# Patient Record
Sex: FEMALE | Race: WHITE | NOT HISPANIC OR LATINO | Employment: OTHER | URBAN - METROPOLITAN AREA
[De-identification: names, ages, dates, MRNs, and addresses within clinical notes are randomized per-mention and may not be internally consistent; named-entity substitution may affect disease eponyms.]

---

## 2019-08-20 ENCOUNTER — OFFICE VISIT (OUTPATIENT)
Dept: DERMATOLOGY | Facility: CLINIC | Age: 64
End: 2019-08-20
Payer: MEDICARE

## 2019-08-20 DIAGNOSIS — L82.1 SEBORRHEIC KERATOSIS: ICD-10-CM

## 2019-08-20 DIAGNOSIS — D22.9 NEVUS: Primary | ICD-10-CM

## 2019-08-20 DIAGNOSIS — Z13.89 SCREENING FOR SKIN CONDITION: ICD-10-CM

## 2019-08-20 PROCEDURE — 99203 OFFICE O/P NEW LOW 30 MIN: CPT | Performed by: DERMATOLOGY

## 2019-08-20 RX ORDER — LEVOTHYROXINE AND LIOTHYRONINE 9.5; 2.25 UG/1; UG/1
TABLET ORAL
COMMUNITY
Start: 2019-05-21

## 2019-08-20 RX ORDER — LEVOTHYROXINE AND LIOTHYRONINE 38; 9 UG/1; UG/1
TABLET ORAL
COMMUNITY
Start: 2019-05-21

## 2019-08-20 NOTE — PATIENT INSTRUCTIONS
Nevi reviewed the concept of ABCDE and ugly duckling nothing markedly atypical patient reassured  Seborrheic Keratosis  Patient reasurred these are normal growths we acquire with age no treatment needed    Screening for Dermatologic Disorders: Nothing else of concern noted on complete exam follow up in 1 year

## 2019-08-20 NOTE — PROGRESS NOTES
[facility] DERMATOLOGY  [facility] [address] [address] [phone]     MRN: [id number] : 1955  Encounter: [id number]  Patient Information: Danial Cueva  Chief complaint:  Skin check up    History of present illness:  43-year-old female without previous history of any skin cancer presents for overall skin check concerned regarding potential lesions no specific changes have been noted  Past Medical History:   Diagnosis Date    Hypothyroidism      Past Surgical History:   Procedure Laterality Date    INCONTINENCE SURGERY       Social History   Social History     Substance and Sexual Activity   Alcohol Use Yes    Alcohol/week: 1 0 standard drinks    Types: 1 Glasses of wine per week    Frequency: 2-4 times a month    Drinks per session: 1 or 2    Binge frequency: Never     Social History     Substance and Sexual Activity   Drug Use Never     Social History     Tobacco Use   Smoking Status Never Smoker   Smokeless Tobacco Never Used     Family History   Problem Relation Age of Onset    Esophageal cancer Father     Lung cancer Sister     Brain cancer Sister     Diabetes type I Brother      Meds/Allergies   Allergies   Allergen Reactions    Sulfa Antibiotics Rash     Gets yeast infections        Meds:  Prior to Admission medications    Medication Sig Start Date End Date Taking?  Authorizing Provider   thyroid (ARMOUR THYROID) 60 MG tablet TAKE 1 TABLET DAILY 19  Yes Historical Provider, MD   thyroid (ARMOUR) 15 MG tablet TAKE 1 TABLET EVERY OTHER DAY 19  Yes Historical Provider, MD       Subjective:     Review of Systems:    General: negative for - chills, fatigue, fever,  weight gain or weight loss  Psychological: negative for - anxiety, behavioral disorder, concentration difficulties, decreased libido, depression, irritability, memory difficulties, mood swings, sleep disturbances or suicidal ideation  ENT: negative for - hearing difficulties , nasal congestion, nasal discharge, oral lesions, sinus pain, sneezing, sore throat  Allergy and Immunology: negative for - hives, insect bite sensitivity,  Hematological and Lymphatic: negative for - bleeding problems, blood clots,bruising, swollen lymph nodes  Endocrine: negative for - hair pattern changes, hot flashes, malaise/lethargy, mood swings, palpitations, polydipsia/polyuria, skin changes, temperature intolerance or unexpected weight change  Respiratory: negative for - cough, hemoptysis, orthopnea, shortness of breath, or wheezing  Cardiovascular: negative for - chest pain, dyspnea on exertion, edema,  Gastrointestinal: negative for - abdominal pain, nausea/vomiting  Genito-Urinary: negative for - dysuria, incontinence, irregular/heavy menses or urinary frequency/urgency  Musculoskeletal: negative for - gait disturbance, joint pain, joint stiffness, joint swelling, muscle pain, muscular weakness  Dermatological:  As in HPI  Neurological: negative for confusion, dizziness, headaches, impaired coordination/balance, memory loss, numbness/tingling, seizures, speech problems, tremors or weakness       Objective: There were no vitals taken for this visit  Physical Exam:    General Appearance:    Alert, cooperative, no distress   Head:    Normocephalic, without obvious abnormality, atraumatic           Skin:   A full skin exam was performed including scalp, head scalp, eyes, ears, nose, lips, neck, chest, axilla, abdomen, back, buttocks, bilateral upper extremities, bilateral lower extremities, hands, feet, fingers, toes, fingernails, and toenails normal pigmented lesion regular shape color normal keratotic papules greasy stuck on appearance nothing markedly atypical noted on complete exam     Assessment:     1  Nevus     2  Seborrheic keratosis     3   Screening for skin condition           Plan:   Nevi reviewed the concept of ABCDE and ugly duckling nothing markedly atypical patient reassured  Seborrheic Keratosis  Patient reasurred these are normal growths we acquire with age no treatment needed  Screening for Dermatologic Disorders: Nothing else of concern noted on complete exam follow up in 1 year       Christopher Sanders MD  8/20/2019,10:46 AM    Portions of the record may have been created with voice recognition software   Occasional wrong word or "sound a like" substitutions may have occurred due to the inherent limitations of voice recognition software   Read the chart carefully and recognize, using context, where substitutions have occurred

## 2019-11-25 ENCOUNTER — TELEPHONE (OUTPATIENT)
Dept: GYNECOLOGIC ONCOLOGY | Facility: CLINIC | Age: 64
End: 2019-11-25

## 2019-11-25 NOTE — TELEPHONE ENCOUNTER
Pt was referred by sisters Vivian Logan and Yin Henderson.  Pt lives in the [facility] and would like to see if Dr Leslie can see her because of Abnormal Pathology Report.  Did not want to schedule appt until she spoke to . Pt can fax copy of report.  Please call [phone].

## 2019-12-04 DIAGNOSIS — N87.1 MODERATE CERVICAL DYSPLASIA: Primary | ICD-10-CM

## 2019-12-11 ENCOUNTER — LAB REQUISITION (OUTPATIENT)
Dept: LAB | Facility: HOSPITAL | Age: 64
End: 2019-12-11
Attending: OBSTETRICS & GYNECOLOGY
Payer: MEDICARE

## 2019-12-11 ENCOUNTER — OFFICE VISIT (OUTPATIENT)
Dept: GYNECOLOGIC ONCOLOGY | Facility: CLINIC | Age: 64
End: 2019-12-11
Attending: OBSTETRICS & GYNECOLOGY
Payer: MEDICARE

## 2019-12-11 VITALS
HEART RATE: 79 BPM | DIASTOLIC BLOOD PRESSURE: 73 MMHG | WEIGHT: 161 LBS | SYSTOLIC BLOOD PRESSURE: 143 MMHG | BODY MASS INDEX: 27.49 KG/M2 | HEIGHT: 64 IN

## 2019-12-11 DIAGNOSIS — N85.02 ENDOMETRIAL INTRAEPITHELIAL NEOPLASIA (EIN): ICD-10-CM

## 2019-12-11 DIAGNOSIS — N85.02 EIN (ENDOMETRIAL INTRAEPITHELIAL NEOPLASIA): Primary | ICD-10-CM

## 2019-12-11 DIAGNOSIS — Z71.89 OTHER SPECIFIED COUNSELING: ICD-10-CM

## 2019-12-11 LAB
CASE RPRT: NORMAL
PATH REPORT.FINAL DX SPEC: NORMAL
PATH REPORT.FINAL DX SPEC: NORMAL
PATH REPORT.GROSS SPEC: NORMAL

## 2019-12-11 PROCEDURE — 99205 OFFICE O/P NEW HI 60 MIN: CPT | Performed by: OBSTETRICS & GYNECOLOGY

## 2019-12-11 RX ORDER — MEGESTROL ACETATE 20 MG/1
20 TABLET ORAL 2 TIMES DAILY
Qty: 60 TABLET | Refills: 3 | Status: SHIPPED | OUTPATIENT
Start: 2019-12-11 | End: 2019-12-11 | Stop reason: SDUPTHER

## 2019-12-11 RX ORDER — MEGESTROL ACETATE 20 MG/1
20 TABLET ORAL 2 TIMES DAILY
Qty: 60 TABLET | Refills: 3 | Status: SHIPPED | OUTPATIENT
Start: 2019-12-11 | End: 2020-08-07

## 2019-12-11 RX ORDER — ACETAMINOPHEN 500 MG
2000 TABLET ORAL DAILY
COMMUNITY

## 2019-12-11 RX ORDER — VIT C/E/ZN/COPPR/LUTEIN/ZEAXAN 250MG-90MG
1 CAPSULE ORAL DAILY
COMMUNITY

## 2019-12-11 RX ORDER — GLUCOSAMINE HCL 500 MG
100 TABLET ORAL DAILY
COMMUNITY

## 2019-12-11 RX ORDER — ACETAMINOPHEN 500 MG
TABLET ORAL DAILY
COMMUNITY
End: 2020-09-14

## 2019-12-11 RX ORDER — PHENOL/SODIUM PHENOLATE
20 AEROSOL, SPRAY (ML) MUCOUS MEMBRANE DAILY
COMMUNITY
End: 2020-09-14

## 2019-12-11 RX ORDER — THYROID, PORCINE 15 MG/1
15 TABLET ORAL EVERY OTHER DAY
Refills: 2 | COMMUNITY
Start: 2019-11-25

## 2019-12-11 RX ORDER — NIACIN 250 MG
250 TABLET ORAL
COMMUNITY

## 2019-12-11 NOTE — PROGRESS NOTES
"Nohelia Godinez presents to the office for  second opinion.     Problem List Items Addressed This Visit        Genitourinary    EIN (endometrial intraepithelial neoplasia) - Primary    Overview     Asymptomatic. Pap showed endometrial cells, US pelvis thickened EMS (per patient)  11/7/19 D&C fragments papillary mucinous epithelial proliferation          Relevant Medications    megestrol (MEGACE) 20 mg tablet            Past Medical History:   Diagnosis Date   • Lipid disorder      Past Surgical History:   Procedure Laterality Date   • LAPAROSCOPY DIAGNOSTIC / BIOPSY / ASPIRATION / LYSIS         Current Outpatient Medications:   •  ARMOUR THYROID 15 mg tablet, Take 15 mg by mouth every other day., Disp: , Rfl: 2  •  aspirin-acetaminophen-caffeine (EXCEDRIN MIGRAINE) 250-250-65 mg per tablet, Take 1 tablet by mouth every 6 hours as needed., Disp: , Rfl:   •  cholecalciferol, vitamin D3, 5,000 unit (125 mcg) tablet, Take by mouth daily., Disp: , Rfl:   •  cholecalciferol, vitamin D3, 50 mcg (2,000 unit) capsule, Take 2,000 Units by mouth daily., Disp: , Rfl:   •  coenzyme Q10 (COQ10) 50 mg capsule, Take 100 mg by mouth daily., Disp: , Rfl:   •  loratadine 10 mg capsule, Take by mouth., Disp: , Rfl:   •  niacin 250 mg tablet, Take 250 mg by mouth., Disp: , Rfl:   •  omeprazole 20 mg tablet, Take 20 mg by mouth daily., Disp: , Rfl:   •  red yeast rice 600 mg capsule, , Disp: , Rfl:   •  megestrol (MEGACE) 20 mg tablet, Take  1 tablet (20 mg total) 2 (two) times a day, Disp: 60 tablet, Rfl: 3  Sulfa (sulfonamide antibiotics)  Cancer-related family history is not on file.   reports that she has never smoked. She has never used smokeless tobacco.  ROS: Negative in all systems with the exception of the above    Physical examination: Well-developed female in no apparent distress  Vitals: BP: 143/73  Heart Rate: 79  Height: 161.3 cm (5' 3.5\")  Weight: 73 kg (161 lb) (12/11 1309)  Body mass index is 28.07 kg/m².  HEENT: " Normocephalic, atraumatic, nonicteric sclera  Neck: Supple no masses, thyroid normal nontender  Lymphatic: No inguinal or supraclavicular adenopathy  Heart: Regular rate no murmurs  Lungs: Clear to auscultation with good respiratory effort  Extremities: No clubbing, cyanosis, edema  Neuro: Oriented ×3 with normal mood and affect  Abdomen: Soft, nontender, nondistended. No hepatosplenomegaly. No rebound or guarding.  Gynecologic:  deferred    60 minutes of face-to-face time were spent in direct consultation with the patient, reviewing her records, and reviewing her pathologic slides.    No results found for this or any previous visit.  Assessment/Plan   Assesment:  Problem List Items Addressed This Visit        Genitourinary    EIN (endometrial intraepithelial neoplasia) - Primary    Overview     Asymptomatic. Pap showed endometrial cells, US pelvis thickened EMS (per patient)  11/7/19 D&C fragments papillary mucinous epithelial proliferation          Relevant Medications    megestrol (MEGACE) 20 mg tablet      Discussed with patient megestrol for suppression of atypical cells until April when she will return.  We discussed total laparoscopic hysterectomy, bilateral salpingectomy.  She is hesitant about oophorectomy and we have discussed the pros and cons.  We also discussed sentinel lymph node biopsy.  She will contact me if she wishes to have surgery at [facility]

## 2019-12-12 ENCOUNTER — TELEPHONE (OUTPATIENT)
Dept: GYNECOLOGIC ONCOLOGY | Facility: CLINIC | Age: 64
End: 2019-12-12

## 2019-12-12 NOTE — TELEPHONE ENCOUNTER
Called with path 2nd opninion:  very focal fragments on D&C, not likely precancer. Rec D&C when patient returns.

## 2020-01-06 ENCOUNTER — TELEPHONE (OUTPATIENT)
Dept: GYNECOLOGIC ONCOLOGY | Facility: CLINIC | Age: 65
End: 2020-01-06

## 2020-01-06 NOTE — TELEPHONE ENCOUNTER
----- Message from Nohelia Godinez sent at 1/4/2020  2:09 PM EST -----  Regarding: Visit Follow-Up Question  Contact: [phone]  Hi Dr Leslie  Thank you for meeting with me on December 11 to review my pathology reports from the November 7 D&C. I would like to schedule a D&C with you in May after we return from [place]. At this point, I feel more comfortable having  the procedure done with you. Let me know when I should schedule the appointment and what pre-tests I need it prior. Thank you. Nohelia Godinez

## 2020-01-27 DIAGNOSIS — N89.8 VAGINAL DRYNESS: Primary | ICD-10-CM

## 2020-01-27 RX ORDER — ESTRADIOL 0.1 MG/G
CREAM VAGINAL
Qty: 42.5 G | Refills: 5 | Status: SHIPPED | OUTPATIENT
Start: 2020-01-27 | End: 2020-01-27 | Stop reason: SDUPTHER

## 2020-01-27 RX ORDER — ESTRADIOL 0.1 MG/G
CREAM VAGINAL
Qty: 42.5 G | Refills: 5 | Status: SHIPPED | OUTPATIENT
Start: 2020-01-27 | End: 2020-09-22

## 2020-03-27 ENCOUNTER — TELEPHONE (OUTPATIENT)
Dept: GYNECOLOGIC ONCOLOGY | Facility: CLINIC | Age: 65
End: 2020-03-27

## 2020-05-04 ENCOUNTER — TELEPHONE (OUTPATIENT)
Dept: GYNECOLOGIC ONCOLOGY | Facility: CLINIC | Age: 65
End: 2020-05-04

## 2020-08-07 ENCOUNTER — TELEPHONE (OUTPATIENT)
Dept: GYNECOLOGIC ONCOLOGY | Facility: CLINIC | Age: 65
End: 2020-08-07

## 2020-08-07 ENCOUNTER — PREP FOR CASE (OUTPATIENT)
Dept: GYNECOLOGIC ONCOLOGY | Facility: CLINIC | Age: 65
End: 2020-08-07

## 2020-08-07 ENCOUNTER — TELEMEDICINE (OUTPATIENT)
Dept: GYNECOLOGIC ONCOLOGY | Facility: CLINIC | Age: 65
End: 2020-08-07
Payer: MEDICARE

## 2020-08-07 DIAGNOSIS — Z01.812 PRE-OPERATIVE LABORATORY EXAMINATION: ICD-10-CM

## 2020-08-07 DIAGNOSIS — Z11.59 SPECIAL SCREENING EXAMINATION FOR VIRAL DISEASE: ICD-10-CM

## 2020-08-07 DIAGNOSIS — N85.02 EIN (ENDOMETRIAL INTRAEPITHELIAL NEOPLASIA): Primary | ICD-10-CM

## 2020-08-07 PROCEDURE — 99214 OFFICE O/P EST MOD 30 MIN: CPT | Mod: 95 | Performed by: OBSTETRICS & GYNECOLOGY

## 2020-08-07 RX ORDER — ACETAMINOPHEN 325 MG/1
975 TABLET ORAL ONCE
Status: CANCELLED | OUTPATIENT
Start: 2020-08-07 | End: 2020-08-07

## 2020-08-07 RX ORDER — THYROID 60 MG/1
60 TABLET ORAL DAILY
COMMUNITY
Start: 2014-01-01

## 2020-08-07 RX ORDER — CELECOXIB 100 MG/1
400 CAPSULE ORAL ONCE
Status: CANCELLED | OUTPATIENT
Start: 2020-08-07 | End: 2020-08-07

## 2020-08-07 RX ORDER — SODIUM CHLORIDE, SODIUM GLUCONATE, SODIUM ACETATE, POTASSIUM CHLORIDE AND MAGNESIUM CHLORIDE 30; 37; 368; 526; 502 MG/100ML; MG/100ML; MG/100ML; MG/100ML; MG/100ML
80 INJECTION, SOLUTION INTRAVENOUS CONTINUOUS
Status: CANCELLED | OUTPATIENT
Start: 2020-08-07 | End: 2020-08-08

## 2020-08-07 NOTE — PROGRESS NOTES
CC: EIN    Problem List Items Addressed This Visit        Genitourinary    EIN (endometrial intraepithelial neoplasia) - Primary    Overview     Asymptomatic. Pap showed endometrial cells, US pelvis thickened EMS (per patient)  11/7/19 D&C fragments papillary mucinous epithelial proliferation                  Request for Consent:   Video Encounter   Héctor, my name is Brian Leslie MD.  Before we proceed, can you please verify your identification by telling me your full name and date of birth?  Can you tell me who is in the room with you?    You and I are about to have a telemedicine check-in or visit because you have requested it.  This is a live video-conference.  I am a real person, speaking to you in real time.  There is no one else with me on the video-conference.  However, when we use ([employer], [employer], etc) it is important for you to know that the video-conference may not be secure or private.  I am not recording this conversation and I am asking you not to record it.  This telemedicine visit will be billed to your health insurance or you, if you are self-insured.  You understand you will be responsible for any copayments or coinsurances that apply to your telemedicine visit.  Communication platform used for this encounter:  Integrated Zoom via [employer] Video Visit     Before starting our telemedicine visit, I am required to get your consent for this virtual check-in or visit by telemedicine. Do you consent?      Patient Response to Request for Consent:  Yes      Past Medical History:   Diagnosis Date   • Lipid disorder        Past Surgical History:   Procedure Laterality Date   • LAPAROSCOPY DIAGNOSTIC / BIOPSY / ASPIRATION / LYSIS           Current Outpatient Medications:   •  ARMOUR THYROID 15 mg tablet, Take 15 mg by mouth every other day., Disp: , Rfl: 2  •  aspirin-acetaminophen-caffeine (EXCEDRIN MIGRAINE) 250-250-65 mg per tablet, Take 1 tablet by mouth every 6 hours as needed., Disp: , Rfl:   •   cholecalciferol, vitamin D3, 5,000 unit (125 mcg) tablet, Take by mouth daily., Disp: , Rfl:   •  cholecalciferol, vitamin D3, 50 mcg (2,000 unit) capsule, Take 2,000 Units by mouth daily., Disp: , Rfl:   •  coenzyme Q10 (COQ10) 50 mg capsule, Take 100 mg by mouth daily., Disp: , Rfl:   •  estradiol (ESTRACE) 0.01 % (0.1 mg/gram) vaginal cream, Apply nightly to  vagina for 1 week, then Monday/Wednesday/ Friday, Disp: 42.5 g, Rfl: 5  •  loratadine 10 mg capsule, Take by mouth., Disp: , Rfl:   •  megestrol (MEGACE) 20 mg tablet, Take  1 tablet (20 mg total) 2 (two) times a day, Disp: 60 tablet, Rfl: 3  •  niacin 250 mg tablet, Take 250 mg by mouth., Disp: , Rfl:   •  omeprazole 20 mg tablet, Take 20 mg by mouth daily., Disp: , Rfl:   •  red yeast rice 600 mg capsule, , Disp: , Rfl:     Allergies   Allergen Reactions   • Sulfa (Sulfonamide Antibiotics) Rash     Gets yeast infections        Visit Documentation:  Call to discuss D&C.  Patient is not having any symptoms of vaginal bleeding currently.  Wishes to have D&C hysteroscopy after August 26.  I discussed the low risk of injury and infection after D&C hysteroscopy.  She plans to her testing including COVID swab at her primary care doctors in the [place].  We will fax out lab slips.          Time Spent in Medical Discussion During This Encounter:  19 minutes

## 2020-08-07 NOTE — LETTER
August 12, 2020     Jacklyn Falcon MD  [address]  [place] [address]    Patient: Nohelia Godinez  YOB: 1955  Date of Visit: 8/7/2020      Dear Dr. Falcon:    Thank you for referring Nohelia Godinez to me for evaluation. Below are my notes for this consultation.    If you have questions, please do not hesitate to call me. I look forward to following your patient along with you.         Sincerely,        Brian Leslie MD        CC: No Recipients  Brian Leslie MD  8/7/2020 12:00 PM  Signed  CC: EIN    Problem List Items Addressed This Visit        Genitourinary    EIN (endometrial intraepithelial neoplasia) - Primary    Overview     Asymptomatic. Pap showed endometrial cells, US pelvis thickened EMS (per patient)  11/7/19 D&C fragments papillary mucinous epithelial proliferation                  Request for Consent:   Video Encounter   Héctor, my name is Brian Leslie MD.  Before we proceed, can you please verify your identification by telling me your full name and date of birth?  Can you tell me who is in the room with you?    You and I are about to have a telemedicine check-in or visit because you have requested it.  This is a live video-conference.  I am a real person, speaking to you in real time.  There is no one else with me on the video-conference.  However, when we use ([facility], [employer], etc) it is important for you to know that the video-conference may not be secure or private.  I am not recording this conversation and I am asking you not to record it.  This telemedicine visit will be billed to your health insurance or you, if you are self-insured.  You understand you will be responsible for any copayments or coinsurances that apply to your telemedicine visit.  Communication platform used for this encounter:  Integrated Zoom via [employer] Video Visit     Before starting our telemedicine visit, I am required to get your consent for this virtual check-in or visit by  telemedicine. Do you consent?      Patient Response to Request for Consent:  Yes      Past Medical History:   Diagnosis Date   • Lipid disorder        Past Surgical History:   Procedure Laterality Date   • LAPAROSCOPY DIAGNOSTIC / BIOPSY / ASPIRATION / LYSIS           Current Outpatient Medications:   •  ARMOUR THYROID 15 mg tablet, Take 15 mg by mouth every other day., Disp: , Rfl: 2  •  aspirin-acetaminophen-caffeine (EXCEDRIN MIGRAINE) 250-250-65 mg per tablet, Take 1 tablet by mouth every 6 hours as needed., Disp: , Rfl:   •  cholecalciferol, vitamin D3, 5,000 unit (125 mcg) tablet, Take by mouth daily., Disp: , Rfl:   •  cholecalciferol, vitamin D3, 50 mcg (2,000 unit) capsule, Take 2,000 Units by mouth daily., Disp: , Rfl:   •  coenzyme Q10 (COQ10) 50 mg capsule, Take 100 mg by mouth daily., Disp: , Rfl:   •  estradiol (ESTRACE) 0.01 % (0.1 mg/gram) vaginal cream, Apply nightly to  vagina for 1 week, then Monday/Wednesday/ Friday, Disp: 42.5 g, Rfl: 5  •  loratadine 10 mg capsule, Take by mouth., Disp: , Rfl:   •  megestrol (MEGACE) 20 mg tablet, Take  1 tablet (20 mg total) 2 (two) times a day, Disp: 60 tablet, Rfl: 3  •  niacin 250 mg tablet, Take 250 mg by mouth., Disp: , Rfl:   •  omeprazole 20 mg tablet, Take 20 mg by mouth daily., Disp: , Rfl:   •  red yeast rice 600 mg capsule, , Disp: , Rfl:     Allergies   Allergen Reactions   • Sulfa (Sulfonamide Antibiotics) Rash     Gets yeast infections        Visit Documentation:  Call to discuss D&C.  Patient is not having any symptoms of vaginal bleeding currently.  Wishes to have D&C hysteroscopy after August 26.  I discussed the low risk of injury and infection after D&C hysteroscopy.  She plans to her testing including COVID swab at her primary care doctors in the [place].  We will fax out lab slips.          Time Spent in Medical Discussion During This Encounter:  19 minutes

## 2020-08-07 NOTE — TELEPHONE ENCOUNTER
Patient just spoke to Dr Leslie re: surgery. She is def. available on 8/31. mPlease call to set up.

## 2020-08-25 ENCOUNTER — OFFICE VISIT (OUTPATIENT)
Dept: DERMATOLOGY | Facility: CLINIC | Age: 65
End: 2020-08-25
Payer: MEDICARE

## 2020-08-25 VITALS — TEMPERATURE: 97 F

## 2020-08-25 DIAGNOSIS — L82.1 SEBORRHEIC KERATOSIS: ICD-10-CM

## 2020-08-25 DIAGNOSIS — D22.9 NEVUS: Primary | ICD-10-CM

## 2020-08-25 DIAGNOSIS — Z13.89 SCREENING FOR SKIN CONDITION: ICD-10-CM

## 2020-08-25 PROCEDURE — 99213 OFFICE O/P EST LOW 20 MIN: CPT | Performed by: DERMATOLOGY

## 2020-08-25 RX ORDER — OMEGA-3S/DHA/EPA/FISH OIL/D3 300MG-1000
CAPSULE ORAL DAILY
COMMUNITY

## 2020-08-25 RX ORDER — AMPICILLIN TRIHYDRATE 250 MG
CAPSULE ORAL
COMMUNITY

## 2020-08-25 NOTE — PROGRESS NOTES
[facility] DERMATOLOGY  [address] [place] [address]  [phone]  [phone]     MRN: [id number] : 1955  Encounter: [id number]  Patient Information: Albino Gillis  Chief complaint:  Yearly skin check    History of present illness:  29-year-old female presents for overall skin check concerned regarding potential skin cancer no specific concerns or changes  Past Medical History:   Diagnosis Date    Hypothyroidism      Past Surgical History:   Procedure Laterality Date    INCONTINENCE SURGERY       Social History   Social History     Substance and Sexual Activity   Alcohol Use Yes    Alcohol/week: 1 0 standard drinks    Types: 1 Glasses of wine per week    Frequency: 2-4 times a month    Drinks per session: 1 or 2    Binge frequency: Never     Social History     Substance and Sexual Activity   Drug Use Never     Social History     Tobacco Use   Smoking Status Never Smoker   Smokeless Tobacco Never Used     Family History   Problem Relation Age of Onset    Esophageal cancer Father     Lung cancer Sister     Brain cancer Sister     Diabetes type I Brother      Meds/Allergies   Allergies   Allergen Reactions    Sulfa Antibiotics Rash     Gets yeast infections        Meds:  Prior to Admission medications    Medication Sig Start Date End Date Taking?  Authorizing Provider   cholecalciferol (VITAMIN D3) 400 units tablet Take by mouth daily   Yes Historical Provider, MD   co-enzyme Q-10 30 MG capsule Take 30 mg by mouth 3 (three) times a day   Yes Historical Provider, MD   Red Yeast Rice 600 MG CAPS Take by mouth   Yes Historical Provider, MD   thyroid (ARMOUR THYROID) 60 MG tablet TAKE 1 TABLET DAILY 19  Yes Historical Provider, MD   thyroid (ARMOUR) 15 MG tablet TAKE 1 TABLET EVERY OTHER DAY 19  Yes Historical Provider, MD       Subjective:     Review of Systems:    General: negative for - chills, fatigue, fever,  weight gain or weight loss  Psychological: negative for - anxiety, behavioral disorder, concentration difficulties, decreased libido, depression, irritability, memory difficulties, mood swings, sleep disturbances or suicidal ideation  ENT: negative for - hearing difficulties , nasal congestion, nasal discharge, oral lesions, sinus pain, sneezing, sore throat  Allergy and Immunology: negative for - hives, insect bite sensitivity,  Hematological and Lymphatic: negative for - bleeding problems, blood clots,bruising, swollen lymph nodes  Endocrine: negative for - hair pattern changes, hot flashes, malaise/lethargy, mood swings, palpitations, polydipsia/polyuria, skin changes, temperature intolerance or unexpected weight change  Respiratory: negative for - cough, hemoptysis, orthopnea, shortness of breath, or wheezing  Cardiovascular: negative for - chest pain, dyspnea on exertion, edema,  Gastrointestinal: negative for - abdominal pain, nausea/vomiting  Genito-Urinary: negative for - dysuria, incontinence, irregular/heavy menses or urinary frequency/urgency  Musculoskeletal: negative for - gait disturbance, joint pain, joint stiffness, joint swelling, muscle pain, muscular weakness  Dermatological:  As in HPI  Neurological: negative for confusion, dizziness, headaches, impaired coordination/balance, memory loss, numbness/tingling, seizures, speech problems, tremors or weakness       Objective:   Temp (!) 97 °F (36 1 °C)     Physical Exam:    General Appearance:    Alert, cooperative, no distress   Head:    Normocephalic, without obvious abnormality, atraumatic           Skin:   A full skin exam was performed including scalp, head scalp, eyes, ears, nose, lips, neck, chest, axilla, abdomen, back, buttocks, bilateral upper extremities, bilateral lower extremities, hands, feet, fingers, toes, fingernails, and toenails normal pigmented lesions regular shape and color normal keratotic papules greasy stuck appearance nothing else remarkable noted on complete exam     Assessment:     1  Nevus     2  Seborrheic keratosis     3  Screening for skin condition           Plan:   Nevi reviewed the concept of ABCDE and ugly duckling nothing markedly atypical patient reassured  Seborrheic Keratosis  Patient reasurred these are normal growths we acquire with age no treatment needed  Screening for Dermatologic Disorders: Nothing else of concern noted on complete exam follow up in 1 year       Teri Braswell MD  8/25/2020,9:58 AM    Portions of the record may have been created with voice recognition software   Occasional wrong word or "sound a like" substitutions may have occurred due to the inherent limitations of voice recognition software   Read the chart carefully and recognize, using context, where substitutions have occurred

## 2020-09-14 ENCOUNTER — HOSPITAL ENCOUNTER (OUTPATIENT)
Dept: CARDIOLOGY | Facility: HOSPITAL | Age: 65
Discharge: HOME | End: 2020-09-14
Attending: OBSTETRICS & GYNECOLOGY
Payer: MEDICARE

## 2020-09-14 ENCOUNTER — APPOINTMENT (OUTPATIENT)
Dept: LAB | Facility: HOSPITAL | Age: 65
End: 2020-09-14
Attending: OBSTETRICS & GYNECOLOGY
Payer: MEDICARE

## 2020-09-14 ENCOUNTER — APPOINTMENT (OUTPATIENT)
Dept: LAB | Facility: HOSPITAL | Age: 65
End: 2020-09-14
Attending: INTERNAL MEDICINE
Payer: MEDICARE

## 2020-09-14 ENCOUNTER — TELEPHONE (OUTPATIENT)
Dept: GYNECOLOGIC ONCOLOGY | Facility: CLINIC | Age: 65
End: 2020-09-14

## 2020-09-14 ENCOUNTER — APPOINTMENT (OUTPATIENT)
Dept: PREADMISSION TESTING | Facility: HOSPITAL | Age: 65
End: 2020-09-14
Attending: OBSTETRICS & GYNECOLOGY
Payer: MEDICARE

## 2020-09-14 ENCOUNTER — TRANSCRIBE ORDERS (OUTPATIENT)
Dept: LAB | Facility: HOSPITAL | Age: 65
End: 2020-09-14

## 2020-09-14 VITALS
HEIGHT: 64 IN | BODY MASS INDEX: 27.14 KG/M2 | DIASTOLIC BLOOD PRESSURE: 70 MMHG | HEART RATE: 64 BPM | TEMPERATURE: 97.6 F | RESPIRATION RATE: 18 BRPM | WEIGHT: 159 LBS | SYSTOLIC BLOOD PRESSURE: 150 MMHG | OXYGEN SATURATION: 95 %

## 2020-09-14 DIAGNOSIS — Z01.812 PRE-OPERATIVE LABORATORY EXAMINATION: ICD-10-CM

## 2020-09-14 DIAGNOSIS — N85.02 EIN (ENDOMETRIAL INTRAEPITHELIAL NEOPLASIA): ICD-10-CM

## 2020-09-14 DIAGNOSIS — Z01.818 PREOP EXAMINATION: Primary | ICD-10-CM

## 2020-09-14 DIAGNOSIS — E06.3 AUTOIMMUNE THYROIDITIS: ICD-10-CM

## 2020-09-14 DIAGNOSIS — Z11.59 SPECIAL SCREENING EXAMINATION FOR VIRAL DISEASE: ICD-10-CM

## 2020-09-14 DIAGNOSIS — E06.3 AUTOIMMUNE THYROIDITIS: Primary | ICD-10-CM

## 2020-09-14 PROBLEM — E04.2 MULTINODULAR GOITER: Status: ACTIVE | Noted: 2018-07-11

## 2020-09-14 LAB
ABO + RH BLD: NORMAL
ANION GAP SERPL CALC-SCNC: 10 MEQ/L (ref 3–15)
ATRIAL RATE: 58
BLD GP AB SCN SERPL QL: NEGATIVE
BLOOD BANK CMNT PATIENT-IMP: NORMAL
BUN SERPL-MCNC: 13 MG/DL (ref 8–20)
CALCIUM SERPL-MCNC: 9.4 MG/DL (ref 8.9–10.3)
CHLORIDE SERPL-SCNC: 104 MEQ/L (ref 98–109)
CO2 SERPL-SCNC: 24 MEQ/L (ref 22–32)
CREAT SERPL-MCNC: 0.8 MG/DL (ref 0.6–1.1)
D AG BLD QL: POSITIVE
ERYTHROCYTE [DISTWIDTH] IN BLOOD BY AUTOMATED COUNT: 13.3 % (ref 11.7–14.4)
GFR SERPL CREATININE-BSD FRML MDRD: >60 ML/MIN/1.73M*2
GLUCOSE SERPL-MCNC: 101 MG/DL (ref 70–99)
HCT VFR BLDCO AUTO: 44.3 % (ref 35–45)
HGB BLD-MCNC: 14.9 G/DL (ref 11.8–15.7)
LABORATORY COMMENT REPORT: NORMAL
MCH RBC QN AUTO: 30.8 PG (ref 28–33.2)
MCHC RBC AUTO-ENTMCNC: 33.6 G/DL (ref 32.2–35.5)
MCV RBC AUTO: 91.5 FL (ref 83–98)
P AXIS: 39
PDW BLD AUTO: 10.4 FL (ref 9.4–12.3)
PLATELET # BLD AUTO: 279 K/UL (ref 150–369)
POTASSIUM SERPL-SCNC: 4.2 MEQ/L (ref 3.6–5.1)
PR INTERVAL: 170
QRS DURATION: 134
QT INTERVAL: 450
QTC CALCULATION(BAZETT): 441
R AXIS: -60
RBC # BLD AUTO: 4.84 M/UL (ref 3.93–5.22)
SODIUM SERPL-SCNC: 138 MEQ/L (ref 136–144)
T WAVE AXIS: 10
T4 FREE SERPL-MCNC: 0.67 NG/DL (ref 0.58–1.64)
TSH SERPL DL<=0.05 MIU/L-ACNC: 3.3 MIU/L (ref 0.34–5.6)
VENTRICULAR RATE: 58
WBC # BLD AUTO: 4.97 K/UL (ref 3.8–10.5)

## 2020-09-14 PROCEDURE — 82310 ASSAY OF CALCIUM: CPT

## 2020-09-14 PROCEDURE — 84439 ASSAY OF FREE THYROXINE: CPT

## 2020-09-14 PROCEDURE — 86901 BLOOD TYPING SEROLOGIC RH(D): CPT

## 2020-09-14 PROCEDURE — 93005 ELECTROCARDIOGRAM TRACING: CPT

## 2020-09-14 PROCEDURE — 85027 COMPLETE CBC AUTOMATED: CPT

## 2020-09-14 PROCEDURE — 84443 ASSAY THYROID STIM HORMONE: CPT

## 2020-09-14 PROCEDURE — 36415 COLL VENOUS BLD VENIPUNCTURE: CPT

## 2020-09-14 PROCEDURE — 93010 ELECTROCARDIOGRAM REPORT: CPT | Performed by: INTERNAL MEDICINE

## 2020-09-14 PROCEDURE — U0003 INFECTIOUS AGENT DETECTION BY NUCLEIC ACID (DNA OR RNA); SEVERE ACUTE RESPIRATORY SYNDROME CORONAVIRUS 2 (SARS-COV-2) (CORONAVIRUS DISEASE [COVID-19]), AMPLIFIED PROBE TECHNIQUE, MAKING USE OF HIGH THROUGHPUT TECHNOLOGIES AS DESCRIBED BY CMS-2020-01-R: HCPCS

## 2020-09-14 RX ORDER — MAGNESIUM 250 MG
250 TABLET ORAL DAILY
COMMUNITY

## 2020-09-14 SDOH — HEALTH STABILITY: MENTAL HEALTH: HOW OFTEN DO YOU HAVE SIX OR MORE DRINKS ON ONE OCCASION?: WEEKLY

## 2020-09-14 ASSESSMENT — PAIN SCALES - GENERAL: PAINLEVEL: 0-NO PAIN

## 2020-09-14 NOTE — TELEPHONE ENCOUNTER
Pt says she does have not the correct bloodwork script. She is having pre-admission testing and she thought that another test was supposed to be added.

## 2020-09-14 NOTE — PRE-PROCEDURE INSTRUCTIONS
1. We will call you between 3 pm and 7 pm on September 16, 2020 to determine that arrival time for your procedure. If you do not hear by 6PM. Please call [phone] for arrival time.    2. Please report to Main Entrance near Parking lot A, walk into main lobby and report to the admission desk on the first floor on the day of your procedure.       3. Please follow the following fasting guidelines:     No solids for EIGHT HOURS prior to surgery.  A light meal, meaning plain toast ONLY, is permitted for up to SIX HOURS prior to surgery.  Unlimited clear liquids, meaning water or PLAIN black coffee WITHOUT any milk or cream, are permitted up to TWO HOURS prior to arrival at the hospital.     4. Early on the morning of the procedure please take your usual dose of the listed medications ARMOUR THYROID with a sip of water:    AVOID ASPIRIN, ADVIL, MOTRIN, ALEVE, VITAMIN E, HERBAL SUPPLEMENTS, FISH OIL, red yeast rice 1 WEEK PRIOR TO SURGERY    YOU MAY TAKE TYLENOL FOR PAIN     5. Other Instructions: You may brush your teeth the morning of the procedure. Rinse and spit, do not swallow.  Bring a list of your medications with dosages with you.  Use surgical wash as directed.    6. If you develop a cold, cough, fever, rash, or other symptom prior to the data of the procedure, please report it to your physician immediately.   7. If you need to cancel the procedure for any reason, please contact your physician or call the unit listed above.   8. Make arrangements to have someone drive you home from the procedure. If you have not arranged for transportation home, your surgery may be cancelled.    9. You may not take public transportation unless accompanied by a responsible person.   10. You may not drive a car or operate complex or potentially dangerous machinery for 24 hours following anesthesia and/or sedation.   11. If it is medically necessary for you to have a longer stay, you will be informed as soon as the decision is  made.   12. Do not wear or bring anything of value to the hospital including jewelry of any kind, money, or wallet. Do not wear make-up or contact lenses. DO bring your glasses and hearing aid. DO NOT BRING MEDICATIONS FROM HOME.   13. No lotion, creams, powders, or oils on skin the morning of procedure    14. Dress in comfortable clothes.   15.  If instructed, please bring a copy of your Advanced Directive (Living Will/Durable Power of ) on the day of your procedure.      Pre operative instructions given as per protocol.  Form explained by: ANSHUL Luciano     I have read and understand the above information. I have had sufficient opportunity to ask questions I might have and they have been answered to my satisfaction. I agree to comply with the Patient Responsibilities listed above and have received a copy of this form.

## 2020-09-14 NOTE — TELEPHONE ENCOUNTER
Patient called back to inform office primary needs TSH ordered with bloodwork also she frequently gets migraines and wants to know if she should stop taking the medication for them, prior to surgery.

## 2020-09-14 NOTE — TELEPHONE ENCOUNTER
Called patient to review need for cardiac clearance. She saw cardiology last year because her PCP found bundle branch block on EKG. Cardiology signed off on that. She will call the [facility] office to get cardiac clearance. I will fax her EKG from today to their office for review. Pt to call back with status update.

## 2020-09-16 ENCOUNTER — ANESTHESIA EVENT (OUTPATIENT)
Dept: OPERATING ROOM | Facility: HOSPITAL | Age: 65
Setting detail: HOSPITAL OUTPATIENT SURGERY
End: 2020-09-16
Payer: MEDICARE

## 2020-09-16 LAB — SARS-COV-2 RNA RESP QL NAA+PROBE: NOT DETECTED

## 2020-09-16 ASSESSMENT — ENCOUNTER SYMPTOMS: DYSRHYTHMIAS: 1

## 2020-09-16 NOTE — H&P (VIEW-ONLY)
Admitting Diagnosis: Thickened endometrial stripe, atypical endometrial cells  HPI     Patient is a 65 y.o. female who presents with Pap in November 2019 showing endometrial cells cells.  Ultrasound at that time showed a thickened endometrial stripe.  She had a D&C hysteroscopy which showed fragments of papillary epithelial proliferation but was reread here at [facility] as showing no atypia and no carcinoma.  She returns today after an episode of postmenopausal bleeding for D&C hysteroscopy     Medical History:   Past Medical History:   Diagnosis Date   • Hypothyroidism    • Lipid disorder    • RBBB        Surgical History:   Past Surgical History:   Procedure Laterality Date   • BLADDER SURGERY      1996 ( for incontinence)   • BREAST LUMPECTOMY Left     benign   • COLONOSCOPY     • INCONTINENCE SURGERY  2008   • LAPAROSCOPY DIAGNOSTIC / BIOPSY / ASPIRATION / LYSIS       OB History:#: 1, Date: None, Sex: None, Weight: None, GA: None, Delivery: None, Apgar1: None, Apgar5: None, Living: None, Birth Comments: None    Social History:   Social History     Socioeconomic History   • Marital status:      Spouse name: Not on file   • Number of children: 2   • Years of education: Not on file   • Highest education level: Not on file   Occupational History   • Occupation: retired   Social Needs   • Financial resource strain: Not on file   • Food insecurity:     Worry: Not on file     Inability: Not on file   • Transportation needs:     Medical: Not on file     Non-medical: Not on file   Tobacco Use   • Smoking status: Never Smoker   • Smokeless tobacco: Never Used   Substance and Sexual Activity   • Alcohol use: Yes     Alcohol/week: 0.0 standard drinks     Binge frequency: Weekly   • Drug use: Not on file   • Sexual activity: Defer   Lifestyle   • Physical activity:     Days per week: Not on file     Minutes per session: Not on file   • Stress: Not on file   Relationships   • Social connections:     Talks on phone: Not  on file     Gets together: Not on file     Attends Congregational service: Not on file     Active member of club or organization: Not on file     Attends meetings of clubs or organizations: Not on file     Relationship status: Not on file   • Intimate partner violence:     Fear of current or ex partner: Not on file     Emotionally abused: Not on file     Physically abused: Not on file     Forced sexual activity: Not on file   Other Topics Concern   • Not on file   Social History Narrative    Lives with spouse in a 2 story home       Family History:   Family History   Problem Relation Age of Onset   • Esophageal cancer Biological Father    • Lung cancer Biological Sister    • Brain cancer Biological Sister    • Diabetes Biological Brother        Allergies: Sulfa (sulfonamide antibiotics) and Gluten       Medication List           Accurate as of September 16, 2020  7:19 AM. If you have any questions, ask your nurse or doctor.               CONTINUE taking these medications    * ARMOUR THYROID 60 mg tablet  Take 60 mg by mouth daily.  Dose:  60 mg  Generic drug:  thyroid (pork)     * ARMOUR THYROID 15 mg tablet  Take 15 mg by mouth every other day.  Dose:  15 mg  Generic drug:  thyroid (pork)     aspirin-acetaminophen-caffeine 250-250-65 mg per tablet  Commonly known as:  EXCEDRIN MIGRAINE  Take 1 tablet by mouth every 6 hours as needed.  Dose:  1 tablet     CALCIUM 600 ORAL  Take 600 mg by mouth daily.  Dose:  600 mg     cholecalciferol (vitamin D3) 50 mcg (2,000 unit) capsule  Take 2,000 Units by mouth daily.  Dose:  2,000 Units     coenzyme Q10 100 mg tablet  Take 100 mg by mouth daily.  Dose:  100 mg     estradioL 0.01 % (0.1 mg/gram) vaginal cream  Commonly known as:  ESTRACE  Apply nightly to  vagina for 1 week, then Monday/Wednesday/ Friday     loratadine 10 mg capsule  Take 10 mg by mouth as needed.  Dose:  10 mg     magnesium 250 mg tablet  Take 250 mg by mouth daily.  Dose:  250 mg     niacin 250 mg tablet  Take  250 mg by mouth daily with breakfast.  Dose:  250 mg     red yeast rice 600 mg capsule  Take 1 capsule by mouth daily.  Dose:  1 capsule         * This list has 2 medication(s) that are the same as other medications prescribed for you. Read the directions carefully, and ask your doctor or other care provider to review them with you.              Review of Systems  All other systems reviewed and negative except as noted in the HPI.    Objective     Vital Signs for the last 24 hours:       Physical Exam:  General: well-developed, well-nourished, no apparent distress  Neck: supple, no masses  Lymphatic: no supraclavicular, cervical, or inguinal adenopathy  Respiratory: lungs clear to auscultation bilaterally, normal respiratory effort  Cardiovascular: regular rate and rhythm, no murmurs, rubs, gallops.   Extremity: no clubbing, cyanosis, edema  GI: abdomen soft, non-distended, non-tender, no hepatosplenomegaly, no masses  Neurologic: alert & oriented x3, no gross deficits  Psychiatric: mood and affect normal  Musculoskeletal: no deformity or gross strength deficit  Gynecologic: normal external female genitalia.    Speculum: normal appearing cervix and vagina   Bimanual: normal sized uterus, no palpable adnexal masses   Rectovaginal: no masses/nodularity      Labs  I have reviewed the patient's labs.  Current labs are within normal limits.    Imaging  Not applicable    ECG   Not applicable.        65-year-old female with postmenopausal bleeding and epithelial proliferation.    Assessment/Plan     Code Status: [unfilled]    D&C hysteroscopy.

## 2020-09-16 NOTE — ANESTHESIA PREPROCEDURE EVALUATION
Relevant Problems   Other   (+) Hypothyroidism due to Hashimoto's thyroiditis       Anesthesia ROS/MED HX      Cardiovascular  Dysrhythmias   Covid19 Test Reviewed and ECG reviewed  Endo/Other   Hypothyroidism  ROS/MED HX Comments:    ECG: Sinus bradycardia  Borderline Low voltage  Left axis deviation  Right bundle branch block  Inferior infarct , age undetermined  No previous ECGs available  Confirmed by YONAS ABRAHAM MD ([id number]) on 9/14/2020 10:16:31 AM    Specimen Collected: 09/14/20 08:23             Past Surgical History:   Procedure Laterality Date   • BLADDER SURGERY      1996 ( for incontinence)   • BREAST LUMPECTOMY Left     benign   • COLONOSCOPY     • INCONTINENCE SURGERY  2008   • LAPAROSCOPY DIAGNOSTIC / BIOPSY / ASPIRATION / LYSIS         Physical Exam    Airway   Mallampati: II   TM distance: >3 FB   Neck ROM: full  Cardiovascular - normal   Rhythm: regular   Rate: normalPulmonary - normal   clear to auscultation  Dental - normal        Anesthesia Plan    Plan: MAC    Technique: MAC     Lines and Monitors: PIV   ASA 2  Anesthetic plan and risks discussed with: patient  Induction:    intravenous   Postop Plan:   Pain Management: IV analgesics

## 2020-09-17 ENCOUNTER — HOSPITAL ENCOUNTER (OUTPATIENT)
Facility: HOSPITAL | Age: 65
Setting detail: HOSPITAL OUTPATIENT SURGERY
Discharge: HOME | End: 2020-09-17
Attending: OBSTETRICS & GYNECOLOGY | Admitting: OBSTETRICS & GYNECOLOGY
Payer: MEDICARE

## 2020-09-17 ENCOUNTER — ANESTHESIA (OUTPATIENT)
Dept: OPERATING ROOM | Facility: HOSPITAL | Age: 65
Setting detail: HOSPITAL OUTPATIENT SURGERY
End: 2020-09-17
Payer: MEDICARE

## 2020-09-17 VITALS
RESPIRATION RATE: 16 BRPM | TEMPERATURE: 97.2 F | HEART RATE: 55 BPM | SYSTOLIC BLOOD PRESSURE: 113 MMHG | OXYGEN SATURATION: 85 % | DIASTOLIC BLOOD PRESSURE: 57 MMHG

## 2020-09-17 DIAGNOSIS — N85.02 EIN (ENDOMETRIAL INTRAEPITHELIAL NEOPLASIA): ICD-10-CM

## 2020-09-17 LAB
ABO + RH BLD: NORMAL
D AG BLD QL: POSITIVE
LABORATORY COMMENT REPORT: NORMAL

## 2020-09-17 PROCEDURE — 58558 HYSTEROSCOPY BIOPSY: CPT | Performed by: OBSTETRICS & GYNECOLOGY

## 2020-09-17 PROCEDURE — 63600000 HC DRUGS/DETAIL CODE: Performed by: NURSE ANESTHETIST, CERTIFIED REGISTERED

## 2020-09-17 PROCEDURE — 63700000 HC SELF-ADMINISTRABLE DRUG: Performed by: OBSTETRICS & GYNECOLOGY

## 2020-09-17 PROCEDURE — 25000000 HC PHARMACY GENERAL: Performed by: OBSTETRICS & GYNECOLOGY

## 2020-09-17 PROCEDURE — 36000012 HC OR LEVEL 2 EA ADDL MIN: Performed by: OBSTETRICS & GYNECOLOGY

## 2020-09-17 PROCEDURE — 71000001 HC PACU PHASE 1 INITIAL 30MIN: Performed by: OBSTETRICS & GYNECOLOGY

## 2020-09-17 PROCEDURE — 37000002 HC ANESTHESIA MAC: Performed by: OBSTETRICS & GYNECOLOGY

## 2020-09-17 PROCEDURE — 36415 COLL VENOUS BLD VENIPUNCTURE: CPT | Performed by: OBSTETRICS & GYNECOLOGY

## 2020-09-17 PROCEDURE — 71000002 HC PACU PHASE 2 INITIAL 30MIN: Performed by: OBSTETRICS & GYNECOLOGY

## 2020-09-17 PROCEDURE — 71000012 HC PACU PHASE 2 EA ADDL MIN: Performed by: OBSTETRICS & GYNECOLOGY

## 2020-09-17 PROCEDURE — 36000002 HC OR LEVEL 2 INITIAL 30MIN: Performed by: OBSTETRICS & GYNECOLOGY

## 2020-09-17 PROCEDURE — 88305 TISSUE EXAM BY PATHOLOGIST: CPT | Performed by: OBSTETRICS & GYNECOLOGY

## 2020-09-17 PROCEDURE — 71000011 HC PACU PHASE 1 EA ADDL MIN: Performed by: OBSTETRICS & GYNECOLOGY

## 2020-09-17 PROCEDURE — 25800000 HC PHARMACY IV SOLUTIONS: Performed by: OBSTETRICS & GYNECOLOGY

## 2020-09-17 PROCEDURE — 0UDB7ZX EXTRACTION OF ENDOMETRIUM, VIA NATURAL OR ARTIFICIAL OPENING, DIAGNOSTIC: ICD-10-PCS | Performed by: OBSTETRICS & GYNECOLOGY

## 2020-09-17 PROCEDURE — 0UJD8ZZ INSPECTION OF UTERUS AND CERVIX, VIA NATURAL OR ARTIFICIAL OPENING ENDOSCOPIC: ICD-10-PCS | Performed by: OBSTETRICS & GYNECOLOGY

## 2020-09-17 PROCEDURE — 63600000 HC DRUGS/DETAIL CODE: Performed by: ANESTHESIOLOGY

## 2020-09-17 RX ORDER — LIDOCAINE HYDROCHLORIDE 10 MG/ML
INJECTION, SOLUTION INFILTRATION; PERINEURAL AS NEEDED
Status: DISCONTINUED | OUTPATIENT
Start: 2020-09-17 | End: 2020-09-17 | Stop reason: HOSPADM

## 2020-09-17 RX ORDER — IBUPROFEN 400 MG/1
400 TABLET ORAL EVERY 4 HOURS PRN
Status: DISCONTINUED | OUTPATIENT
Start: 2020-09-17 | End: 2020-09-17 | Stop reason: HOSPADM

## 2020-09-17 RX ORDER — DEXTROSE 50 % IN WATER (D50W) INTRAVENOUS SYRINGE
25 AS NEEDED
Status: DISCONTINUED | OUTPATIENT
Start: 2020-09-17 | End: 2020-09-17 | Stop reason: HOSPADM

## 2020-09-17 RX ORDER — IBUPROFEN 200 MG
16-32 TABLET ORAL AS NEEDED
Status: DISCONTINUED | OUTPATIENT
Start: 2020-09-17 | End: 2020-09-17 | Stop reason: HOSPADM

## 2020-09-17 RX ORDER — PROPOFOL 10 MG/ML
INJECTION, EMULSION INTRAVENOUS CONTINUOUS PRN
Status: DISCONTINUED | OUTPATIENT
Start: 2020-09-17 | End: 2020-09-17 | Stop reason: SURG

## 2020-09-17 RX ORDER — FENTANYL CITRATE 50 UG/ML
50 INJECTION, SOLUTION INTRAMUSCULAR; INTRAVENOUS
Status: DISCONTINUED | OUTPATIENT
Start: 2020-09-17 | End: 2020-09-17 | Stop reason: HOSPADM

## 2020-09-17 RX ORDER — DEXTROSE 40 %
15-30 GEL (GRAM) ORAL AS NEEDED
Status: DISCONTINUED | OUTPATIENT
Start: 2020-09-17 | End: 2020-09-17 | Stop reason: HOSPADM

## 2020-09-17 RX ORDER — ACETAMINOPHEN 325 MG/1
650 TABLET ORAL EVERY 4 HOURS PRN
Status: DISCONTINUED | OUTPATIENT
Start: 2020-09-17 | End: 2020-09-17 | Stop reason: HOSPADM

## 2020-09-17 RX ORDER — PROPOFOL 200MG/20ML
SYRINGE (ML) INTRAVENOUS AS NEEDED
Status: DISCONTINUED | OUTPATIENT
Start: 2020-09-17 | End: 2020-09-17 | Stop reason: SURG

## 2020-09-17 RX ORDER — SODIUM CHLORIDE, SODIUM GLUCONATE, SODIUM ACETATE, POTASSIUM CHLORIDE AND MAGNESIUM CHLORIDE 30; 37; 368; 526; 502 MG/100ML; MG/100ML; MG/100ML; MG/100ML; MG/100ML
80 INJECTION, SOLUTION INTRAVENOUS CONTINUOUS
Status: DISCONTINUED | OUTPATIENT
Start: 2020-09-17 | End: 2020-09-17 | Stop reason: HOSPADM

## 2020-09-17 RX ORDER — ONDANSETRON 4 MG/1
4 TABLET, ORALLY DISINTEGRATING ORAL EVERY 8 HOURS PRN
Status: DISCONTINUED | OUTPATIENT
Start: 2020-09-17 | End: 2020-09-17 | Stop reason: HOSPADM

## 2020-09-17 RX ORDER — MIDAZOLAM HYDROCHLORIDE 2 MG/2ML
INJECTION, SOLUTION INTRAMUSCULAR; INTRAVENOUS AS NEEDED
Status: DISCONTINUED | OUTPATIENT
Start: 2020-09-17 | End: 2020-09-17 | Stop reason: SURG

## 2020-09-17 RX ORDER — ONDANSETRON HYDROCHLORIDE 2 MG/ML
4 INJECTION, SOLUTION INTRAVENOUS
Status: DISCONTINUED | OUTPATIENT
Start: 2020-09-17 | End: 2020-09-17 | Stop reason: HOSPADM

## 2020-09-17 RX ORDER — KETOROLAC TROMETHAMINE 30 MG/ML
INJECTION, SOLUTION INTRAMUSCULAR; INTRAVENOUS AS NEEDED
Status: DISCONTINUED | OUTPATIENT
Start: 2020-09-17 | End: 2020-09-17 | Stop reason: SURG

## 2020-09-17 RX ORDER — ACETAMINOPHEN 325 MG/1
975 TABLET ORAL ONCE
Status: COMPLETED | OUTPATIENT
Start: 2020-09-17 | End: 2020-09-17

## 2020-09-17 RX ORDER — FENTANYL CITRATE 50 UG/ML
INJECTION, SOLUTION INTRAMUSCULAR; INTRAVENOUS AS NEEDED
Status: DISCONTINUED | OUTPATIENT
Start: 2020-09-17 | End: 2020-09-17 | Stop reason: SURG

## 2020-09-17 RX ORDER — CELECOXIB 200 MG/1
400 CAPSULE ORAL ONCE
Status: COMPLETED | OUTPATIENT
Start: 2020-09-17 | End: 2020-09-17

## 2020-09-17 RX ORDER — HYDROMORPHONE HYDROCHLORIDE 1 MG/ML
0.5 INJECTION, SOLUTION INTRAMUSCULAR; INTRAVENOUS; SUBCUTANEOUS
Status: DISCONTINUED | OUTPATIENT
Start: 2020-09-17 | End: 2020-09-17 | Stop reason: HOSPADM

## 2020-09-17 RX ADMIN — FENTANYL CITRATE 25 MCG: 50 INJECTION, SOLUTION INTRAMUSCULAR; INTRAVENOUS at 07:37

## 2020-09-17 RX ADMIN — KETOROLAC TROMETHAMINE 15 MG: 30 INJECTION, SOLUTION INTRAMUSCULAR at 07:47

## 2020-09-17 RX ADMIN — FENTANYL CITRATE 25 MCG: 50 INJECTION, SOLUTION INTRAMUSCULAR; INTRAVENOUS at 07:40

## 2020-09-17 RX ADMIN — CELECOXIB 400 MG: 200 CAPSULE ORAL at 06:56

## 2020-09-17 RX ADMIN — PROPOFOL 40 MG: 10 INJECTION, EMULSION INTRAVENOUS at 07:30

## 2020-09-17 RX ADMIN — ACETAMINOPHEN 975 MG: 325 TABLET, FILM COATED ORAL at 06:56

## 2020-09-17 RX ADMIN — PROPOFOL INJECTABLE EMULSION 100 MCG/KG/MIN: 10 INJECTION, EMULSION INTRAVENOUS at 07:30

## 2020-09-17 RX ADMIN — MIDAZOLAM HYDROCHLORIDE 2 MG: 1 INJECTION, SOLUTION INTRAMUSCULAR; INTRAVENOUS at 07:24

## 2020-09-17 RX ADMIN — FENTANYL CITRATE 25 MCG: 50 INJECTION, SOLUTION INTRAMUSCULAR; INTRAVENOUS at 07:29

## 2020-09-17 RX ADMIN — FENTANYL CITRATE 25 MCG: 50 INJECTION, SOLUTION INTRAMUSCULAR; INTRAVENOUS at 07:26

## 2020-09-17 RX ADMIN — FENTANYL CITRATE 50 MCG: 0.05 INJECTION, SOLUTION INTRAMUSCULAR; INTRAVENOUS at 08:56

## 2020-09-17 RX ADMIN — SODIUM CHLORIDE, SODIUM GLUCONATE, SODIUM ACETATE, POTASSIUM CHLORIDE AND MAGNESIUM CHLORIDE 80 ML/HR: 526; 502; 368; 37; 30 INJECTION, SOLUTION INTRAVENOUS at 06:59

## 2020-09-17 ASSESSMENT — PAIN - FUNCTIONAL ASSESSMENT: PAIN_FUNCTIONAL_ASSESSMENT: NO/DENIES PAIN

## 2020-09-17 NOTE — ANESTHESIOLOGIST PRE-PROCEDURE ATTESTATION
Pre-Procedure Patient Identification:  I am the Primary Anesthesiologist and have identified the patient on 09/17/20 at 7:17 AM.   I have confirmed the following procedure(s) D&C, HYSTEROSCOPY DIAGNOSTIC will be performed by the following surgeon/proceduralist Brian Leslie MD.

## 2020-09-17 NOTE — DISCHARGE INSTRUCTIONS
[facility]  Dilation and Curettage (D&C/Hysteroscopy)  Discharge Instructions    What to Expect    It is normal to have light bleeding.  This can occur immediately after the procedure or in a few days.      It is normal to experience some mild cramping after the procedure but this usually does not last for more than a few days.  You may take Acetaminophen (Tylenol) or Ibuprofen (Motrin, Advil) as directed for pain.    Activity      Please do not get in a swimming pool, hot tub or tub bath for at least 2-3 days after your procedure.      You may resume normal activities in 1-2 days. Please delay sexual intercourse for at least 5-7 days.      PLEASE CALL THE OFFICE -[phone] IF YOU EXPERIENCE ANY OF THE FOLLOWING:    - Heavy bleeding (using one pad an hour)  - Fever greater than 101.0   - Foul smelling vaginal discharge   - Worsening pain or any other concerns

## 2020-09-17 NOTE — OP NOTE
Pre-operative diagnosis: PMB  Procedure: Dilation and curettage, hysteroscopy  Post-operative diagnosis: same as above  Surgeon: Dr. Leslie  Assistant: Ronnell Ortega MD  Anesthesia: MAC, local  EBL: minimal  Specimen: EMC  Complications: none  Disposition: stable     Ms. Godinez presented to [facility] on 9/17/2020. The surgeon and patient were mutually identified in the preoperative area. Her consents were reconfirmed and her questions were again answered. She was taken to the operating suite and administered MAC anesthesia . She was placed in the dorsal lithotomy position. Bi manual examination noted the uterus to be midposition with non palpable adnexa.She was prepped and draped in the usual sterile fashion. A timeout was performed.    A Mendez speculum and Manton retractor were placed in the patient’s vagina. A paracervical block was administered with 1% lidocaine. Her cervix was dilated to 13 Slovenian in order to accommodate the uterine sound. The uterus was sounded to 6cm. The cervix then serially dilated using Cruz dilators to accommodate a 5 mm hysteroscope, which was advanced through the cervix to the uterine fundus and uterine distention was applied. There were no signs of injury or uterine perforation. Endometrium was well seen and was grossly normal in appearance with scant, atrophic endometrial tissue. The hysteroscope was removed, gentle sharp curettage was then performed in 360° fashion. This specimen was sent to pathology for permanent evaluation. The hysteroscopy was reinserted and the endometrium was found to have been diffusely sampled without signs of injury or perforation. The hysteroscope was removed.     Bleeding at the cervical os was noted to be minimal. The tenaculum was removed from the cervix and the site was hemostatic. All instruments were removed from the vagina. Sponge and instrument counts were correct at the completion of the procedure. The patient tolerated the procedure  well and was taken to the PACU in stable condition.      Dr. Leslie was present and scrubbed for the entirety of the procedure.    Ronnell Ortega MD

## 2020-09-17 NOTE — LETTER
Dr. Dodd, Attached below is the operative reports for the D&C, hysteroscopy performed on your patient Samreen Ralph on 9/17. Thank you.     ---    Pre-operative diagnosis: PMB  Procedure: Dilation and curettage, hysteroscopy  Post-operative diagnosis: same as above  Surgeon: Dr. Leslie  Assistant: Ronnell Ortega MD  Anesthesia: MAC, local  EBL: minimal  Specimen: EMC  Complications: none  Disposition: stable     Ms. Godinez presented to [facility] on 9/17/2020. The surgeon and patient were mutually identified in the preoperative area. Her consents were reconfirmed and her questions were again answered. She was taken to the operating suite and administered MAC anesthesia . She was placed in the dorsal lithotomy position. Bi manual examination noted the uterus to be midposition with non palpable adnexa.She was prepped and draped in the usual sterile fashion. A timeout was performed.     A Mendez speculum and Mayte retractor were placed in the patient’s vagina. A paracervical block was administered with 1% lidocaine. Her cervix was dilated to 13 Guatemalan in order to accommodate the uterine sound. The uterus was sounded to 6cm. The cervix then serially dilated using Cruz dilators to accommodate a 5 mm hysteroscope, which was advanced through the cervix to the uterine fundus and uterine distention was applied. There were no signs of injury or uterine perforation. Endometrium was well seen and was grossly normal in appearance with scant, atrophic endometrial tissue. The hysteroscope was removed, gentle sharp curettage was then performed in 360° fashion. This specimen was sent to pathology for permanent evaluation. The hysteroscopy was reinserted and the endometrium was found to have been diffusely sampled without signs of injury or perforation. The hysteroscope was removed.      Bleeding at the cervical os was noted to be minimal. The tenaculum was removed from the cervix and the site was hemostatic. All  instruments were removed from the vagina. Sponge and instrument counts were correct at the completion of the procedure. The patient tolerated the procedure well and was taken to the PACU in stable condition.      Dr. Leslie was present and scrubbed for the entirety of the procedure.     Ronnell Ortega MD

## 2020-09-17 NOTE — OR SURGEON
Pre-Procedure patient identification:  I am the primary operating surgeon/proceduralist and I have identified the patient on 09/17/20 at 7:05 AM Brian Leslie MD  Phone Number: [phone]

## 2020-09-17 NOTE — ANESTHESIA POSTPROCEDURE EVALUATION
Patient: Nohelia Godinez    Procedure Summary     Date:  09/17/20 Room / Location:  [facility] OR 12 / [facility] OR    Anesthesia Start:  0724 Anesthesia Stop:  0801    Procedure:  D&C, HYSTEROSCOPY DIAGNOSTIC (N/A Vagina ) Diagnosis:       EIN (endometrial intraepithelial neoplasia)      (EIN (endometrial intraepithelial neoplasia) [N85.02])    Surgeon:  Brian Leslie MD Responsible Provider:  Manny Ny MD    Anesthesia Type:  MAC ASA Status:  2          Anesthesia Type: MAC  PACU Vitals  9/17/2020 0753 - 9/17/2020 0853      9/17/2020  0759 9/17/2020  0800 9/17/2020  0815 9/17/2020  0830    BP:  119/60  119/60  115/67  121/62    Temp:  36.1 °C (97 °F)  --  --  --    Pulse:  (!) 51  (!) 52  (!) 48  (!) 50    Resp:  (!) 11  (!) 10  (!) 9  13    SpO2:  96 %  --  --  --            Anesthesia Post Evaluation    Pain management: adequate  Patient location during evaluation: PACU  Patient participation: complete - patient participated  Level of consciousness: awake and alert  Cardiovascular status: acceptable  Airway Patency: adequate  Respiratory status: acceptable  Hydration status: acceptable  Anesthetic complications: no

## 2020-09-18 ENCOUNTER — TELEPHONE (OUTPATIENT)
Dept: GYNECOLOGIC ONCOLOGY | Facility: CLINIC | Age: 65
End: 2020-09-18

## 2020-09-18 LAB
CASE RPRT: NORMAL
CLINICAL INFO: NORMAL
PATH REPORT.FINAL DX SPEC: NORMAL
PATH REPORT.GROSS SPEC: NORMAL

## 2020-09-21 ENCOUNTER — TELEPHONE (OUTPATIENT)
Dept: GYNECOLOGIC ONCOLOGY | Facility: CLINIC | Age: 65
End: 2020-09-21

## 2020-09-22 DIAGNOSIS — N89.8 VAGINAL DRYNESS: ICD-10-CM

## 2020-09-22 RX ORDER — ESTRADIOL 0.1 MG/G
2 CREAM VAGINAL 3 TIMES WEEKLY
Qty: 2 TUBE | Refills: 3 | Status: SHIPPED | OUTPATIENT
Start: 2020-09-23 | End: 2020-12-22

## 2024-06-09 ENCOUNTER — OFFICE VISIT (OUTPATIENT)
Age: 69
End: 2024-06-09
Payer: MEDICARE

## 2024-06-09 VITALS
DIASTOLIC BLOOD PRESSURE: 67 MMHG | OXYGEN SATURATION: 95 % | RESPIRATION RATE: 18 BRPM | SYSTOLIC BLOOD PRESSURE: 117 MMHG | WEIGHT: 164 LBS | HEIGHT: 63 IN | TEMPERATURE: 98.8 F | BODY MASS INDEX: 29.06 KG/M2 | HEART RATE: 74 BPM

## 2024-06-09 DIAGNOSIS — U07.1 COVID-19: Primary | ICD-10-CM

## 2024-06-09 DIAGNOSIS — R68.89 FLU-LIKE SYMPTOMS: ICD-10-CM

## 2024-06-09 PROBLEM — E04.2 MULTINODULAR GOITER: Status: ACTIVE | Noted: 2018-07-11

## 2024-06-09 PROBLEM — N85.02 EIN (ENDOMETRIAL INTRAEPITHELIAL NEOPLASIA): Status: ACTIVE | Noted: 2019-12-11

## 2024-06-09 PROBLEM — E03.8 HYPOTHYROIDISM DUE TO HASHIMOTO'S THYROIDITIS: Status: ACTIVE | Noted: 2018-07-11

## 2024-06-09 PROBLEM — N39.0 FREQUENT UTI: Status: ACTIVE | Noted: 2023-06-01

## 2024-06-09 PROBLEM — E06.3 HYPOTHYROIDISM DUE TO HASHIMOTO'S THYROIDITIS: Status: ACTIVE | Noted: 2018-07-11

## 2024-06-09 PROBLEM — N85.02 COMPLEX ENDOMETRIAL HYPERPLASIA WITH ATYPIA: Status: ACTIVE | Noted: 2019-10-24

## 2024-06-09 PROBLEM — E78.2 HYPERLIPIDEMIA, MIXED: Status: ACTIVE | Noted: 2018-07-11

## 2024-06-09 PROBLEM — R35.0 INCREASED FREQUENCY OF URINATION: Status: ACTIVE | Noted: 2019-09-17

## 2024-06-09 PROBLEM — R93.89 THICKENED ENDOMETRIUM: Status: ACTIVE | Noted: 2019-10-24

## 2024-06-09 PROBLEM — E66.3 OVERWEIGHT (BMI 25.0-29.9): Status: ACTIVE | Noted: 2018-07-11

## 2024-06-09 PROBLEM — N89.8 VAGINAL DRYNESS: Status: ACTIVE | Noted: 2019-09-17

## 2024-06-09 LAB
SARS-COV-2 AG UPPER RESP QL IA: POSITIVE
VALID CONTROL: ABNORMAL

## 2024-06-09 PROCEDURE — 87811 SARS-COV-2 COVID19 W/OPTIC: CPT

## 2024-06-09 PROCEDURE — G0463 HOSPITAL OUTPT CLINIC VISIT: HCPCS

## 2024-06-09 PROCEDURE — 99213 OFFICE O/P EST LOW 20 MIN: CPT

## 2024-06-09 RX ORDER — FLUTICASONE PROPIONATE 50 MCG
1 SPRAY, SUSPENSION (ML) NASAL DAILY
Qty: 9.9 ML | Refills: 0 | Status: SHIPPED | OUTPATIENT
Start: 2024-06-09

## 2024-06-09 RX ORDER — BENZONATATE 100 MG/1
100 CAPSULE ORAL 3 TIMES DAILY PRN
Qty: 20 CAPSULE | Refills: 0 | Status: SHIPPED | OUTPATIENT
Start: 2024-06-09

## 2024-06-09 RX ORDER — LORATADINE 10 MG/1
10 CAPSULE, LIQUID FILLED ORAL
COMMUNITY

## 2024-06-09 RX ORDER — SODIUM FLUORIDE1.1%, POTASSIUM NITRATE 5% 5.8; 57.5 MG/ML; MG/ML
GEL, DENTIFRICE DENTAL
COMMUNITY
Start: 2024-04-16

## 2024-06-09 RX ORDER — ONDANSETRON 4 MG/1
4 TABLET, FILM COATED ORAL EVERY 8 HOURS PRN
Qty: 20 TABLET | Refills: 0 | Status: SHIPPED | OUTPATIENT
Start: 2024-06-09

## 2024-06-09 RX ORDER — OMEPRAZOLE 20 MG/1
20 TABLET, DELAYED RELEASE ORAL DAILY
COMMUNITY

## 2024-06-09 RX ORDER — ESTRADIOL 0.1 MG/G
2 CREAM VAGINAL DAILY
COMMUNITY

## 2024-06-09 RX ORDER — NIACIN 250 MG
250 TABLET ORAL
COMMUNITY

## 2024-06-09 NOTE — PATIENT INSTRUCTIONS
Symptoms of a viral infection may linger for up to 10 days. Your contagious period is the first 5-7 days of symptom onset. Continue over-the-counter products for symptoms: tylenol for fevers, ibuprofen for body aches, flonase (fluticasone) with nasal saline for congestion, tessalon perles/robitussin/coricidin for cough, and airborne/emergen-c for vitamin supplementation. Take Zofran 15-20 minutes prior to eating as directed for acute episodes of nausea/vomiting.  Initiate BRAT (banana, rice, applesauce, toast) diet with clear liquids for next 24-48 hours. Give Zofran 15-20 minutes prior to feeding. May give tylenol/ibuprofen every 4-6 hours for pain and fever. Follow-up with PCP if no improvement of symptoms within 24-48 hours. Report to ER if symptoms worsen - develop difficulty breathing or unable to tolerate oral intake for greater than 24 hours.

## (undated) DEVICE — TUBING CYSTO BLADDER IRRIG

## (undated) DEVICE — SYRINGE 10CC CONTROL LL

## (undated) DEVICE — ***USE 57698*** SLEEVE FLOWTRON DVT CALF SINGLE USE

## (undated) DEVICE — Device

## (undated) DEVICE — SOLN IRRIG .9%SOD 3L

## (undated) DEVICE — GLOVE SZ 7 LINER PROTEXIS PI BL

## (undated) DEVICE — SOLN IRRIG STER WATER 1000ML

## (undated) DEVICE — TRAY WET SKIN PREP PREMIUM

## (undated) DEVICE — GOWN SURG X-LARGE MICROCOOL

## (undated) DEVICE — GOWN SURGICAL REINFORCED X-LAR

## (undated) DEVICE — GLOVE PROTEXIS PI ORTHO 7.0